# Patient Record
Sex: FEMALE
[De-identification: names, ages, dates, MRNs, and addresses within clinical notes are randomized per-mention and may not be internally consistent; named-entity substitution may affect disease eponyms.]

---

## 2018-10-24 ENCOUNTER — HOSPITAL ENCOUNTER (EMERGENCY)
Dept: HOSPITAL 25 - UCEAST | Age: 28
Discharge: HOME | End: 2018-10-24
Payer: SELF-PAY

## 2018-10-24 DIAGNOSIS — R10.31: Primary | ICD-10-CM

## 2018-10-24 PROCEDURE — 81003 URINALYSIS AUTO W/O SCOPE: CPT

## 2018-10-24 PROCEDURE — 99201: CPT

## 2018-10-24 PROCEDURE — G0463 HOSPITAL OUTPT CLINIC VISIT: HCPCS

## 2018-10-24 PROCEDURE — 84702 CHORIONIC GONADOTROPIN TEST: CPT

## 2018-10-24 NOTE — UC
Abdominal Pain Female HPI





- HPI Summary


HPI Summary: 





The patient is a 28-year-old female with the acute onset of right lower 

quadrant abdominal pain that started on 10/22.  The pain has been a constant 4-

5 out of 10.  She has had no fever or chills.  She has had no nausea /vomiting.

  She denies any UTI symptoms or vaginal discharge or itching.  Denies any back 

pain.  Tight has been good.  I went in to see her at 12:50 PM.  She stated she 

had a class by 1:00.  The radiology department was notified and they said that 

there was no way that they could squeeze her in for an ultrasound today.  She 

is pressure time she declines a pelvic exam.





- History of Current Complaint


Chief Complaint: UCAbdominalPain


Stated Complaint: ABDOMINAL PAIN


Time Seen by Provider: 10/24/18 12:48


Hx Obtained From: Patient


Onset/Duration: Sudden Onset


Timing: Constant


Severity Initially: Moderate


Severity Currently: Moderate


Pain Intensity: 5 - declines analgesic


Pain Scale Used: 0-10 Numeric


Location: Discrete At: RLQ


Radiates: No


Character: Aching, Dull


Aggravating Factor(s): Nothing


Alleviating Factor(s): Nothing


Associated Signs and Symptoms: Negative: Diaphoresis, Fever, Cough, Chest Pain, 

Dizzy, Back Pain, Constipation, Blood in Stool, Urinary Symptoms, Decreased 

Appetite, Vaginal Bleeding, Vaginal Discharge, Nausea, Vomiting, Diarrhea


Allergies/Adverse Reactions: 


 Allergies











Allergy/AdvReac Type Severity Reaction Status Date / Time


 


No Known Allergies Allergy   Verified 10/24/18 11:24











Home Medications: 


 Home Medications





NK [No Home Medications Reported]  10/24/18 [History Confirmed 10/24/18]











PMH/Surg Hx/FS Hx/Imm Hx


Previously Healthy: Yes





- Surgical History


Surgical History: None





- Family History


Known Family History: Positive: Hypertension





- Social History


Alcohol Use: Occasionally


Substance Use Type: None


Smoking Status (MU): Never Smoked Tobacco





Review of Systems


Constitutional: Negative


Skin: Negative


Eyes: Negative


ENT: Negative


Respiratory: Negative


Cardiovascular: Negative


Gastrointestinal: Abdominal Pain


Genitourinary: Negative


Motor: Negative


Neurovascular: Negative


Musculoskeletal: Negative


Neurological: Negative


Psychological: Negative


All Other Systems Reviewed And Are Negative: Yes





Physical Exam


Triage Information Reviewed: Yes


Appearance: Well-Appearing, No Pain Distress, Well-Nourished


Vital Signs: 


 Initial Vital Signs











Temp  98.4 F   10/24/18 11:20


 


Pulse  81   10/24/18 11:20


 


Resp  18   10/24/18 11:20


 


BP  108/61   10/24/18 11:20


 


Pulse Ox  100   10/24/18 11:20











Vital Signs Reviewed: Yes


Eyes: Positive: Conjunctiva Clear


ENT Exam: Normal


ENT: Positive: Hearing grossly normal.  Negative: Nasal congestion, Nasal 

drainage, Tonsillar exudate, Trismus, Hoarse voice


Neck: Positive: Supple, Nontender


Respiratory Exam: Normal


Respiratory: Positive: Lungs clear, Normal breath sounds, No respiratory 

distress, No accessory muscle use


Cardiovascular: Positive: RRR, No Murmur.  Negative: Tachycardia, Bradycardia


Abdomen Description: Positive: Soft.  Negative: Nontender - slight RLQ abd 

tenderness, able to jump up and down with out pain, CVA Tenderness (R), CVA 

Tenderness (L)


Bowel Sounds: Positive: Present


Musculoskeletal: Positive: ROM Intact, No Edema


Neurological: Positive: Alert


Psychological Exam: Normal


Skin Exam: Normal





Abd Pain Female Course/Dx





- Course


Course Of Treatment: The patient was unable to wait for an ultrasound to rule 

in or rule out an ovarian cyst.  She was intent on attending her classes this 

afternoon.  She was advised to go to the emergency room for further evaluation 

especially if she developed fever, vomiting,  or worsening pain.  She is also 

given the name and number of the on-call OB/GYN.  She was informed that if she 

returned here tomorrow morning there was a better chance of us being able to 

get her an ultrasound.





- Differential Dx/Diagnosis


Provider Diagnoses: abdominal pain of uncertain cause





Discharge





- Sign-Out/Discharge


Documenting (check all that apply): Patient Departure


All imaging exams completed and their final reports reviewed: No Studies





- Discharge Plan


Condition: Stable


Disposition: HOME


Patient Education Materials:  Ovarian Cyst (ED), Acute Abdominal Pain (ED)


Referrals: 


Lizzy Padilla MD [Medical Doctor] - As Soon As Possible


Additional Instructions: 


I suspect an ovarian cyst 





Please go directly to the ER if pain worsens/you develop a fever or start 

vomiting





We were not able to do an ultrasound here today





advil or aleve





I suggest you see an OB-GYN or return here so we can better evaluate your 

symptoms











- Billing Disposition and Condition


Condition: STABLE


Disposition: Home